# Patient Record
Sex: MALE | Race: WHITE | Employment: PART TIME | URBAN - METROPOLITAN AREA
[De-identification: names, ages, dates, MRNs, and addresses within clinical notes are randomized per-mention and may not be internally consistent; named-entity substitution may affect disease eponyms.]

---

## 2021-10-14 ENCOUNTER — HOSPITAL ENCOUNTER (EMERGENCY)
Age: 41
Discharge: HOME | End: 2021-10-14
Payer: COMMERCIAL

## 2021-10-14 VITALS
DIASTOLIC BLOOD PRESSURE: 94 MMHG | SYSTOLIC BLOOD PRESSURE: 137 MMHG | OXYGEN SATURATION: 98 % | HEIGHT: 70 IN | HEART RATE: 98 BPM | TEMPERATURE: 98.8 F | BODY MASS INDEX: 31.25 KG/M2 | RESPIRATION RATE: 16 BRPM | WEIGHT: 218.26 LBS

## 2021-10-14 DIAGNOSIS — S30.0XXA CONTUSION OF LOWER BACK, INITIAL ENCOUNTER: Primary | ICD-10-CM
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
## 2021-10-14 PROCEDURE — 99283 EMERGENCY DEPT VISIT LOW MDM: CPT

## 2021-10-14 RX ORDER — IBUPROFEN 600 MG/1
600 TABLET ORAL
Qty: 30 TABLET | Refills: 0 | Status: SHIPPED | OUTPATIENT
Start: 2021-10-14

## 2021-10-14 RX ORDER — METHOCARBAMOL 500 MG/1
500 TABLET, FILM COATED ORAL EVERY 6 HOURS PRN
Qty: 30 TABLET | Refills: 0 | Status: SHIPPED | OUTPATIENT
Start: 2021-10-14 | End: 2021-10-24

## 2021-10-14 ASSESSMENT — PAIN DESCRIPTION - ONSET: ONSET: SUDDEN

## 2021-10-14 ASSESSMENT — PAIN SCALES - GENERAL: PAINLEVEL_OUTOF10: 7

## 2021-10-14 ASSESSMENT — PAIN DESCRIPTION - FREQUENCY: FREQUENCY: CONTINUOUS

## 2021-10-14 ASSESSMENT — PAIN DESCRIPTION - LOCATION: LOCATION: BACK

## 2021-10-14 ASSESSMENT — PAIN DESCRIPTION - ORIENTATION: ORIENTATION: LOWER

## 2021-10-14 ASSESSMENT — PAIN DESCRIPTION - PAIN TYPE: TYPE: ACUTE PAIN

## 2021-10-14 ASSESSMENT — PAIN DESCRIPTION - DESCRIPTORS: DESCRIPTORS: CONSTANT;SHARP

## 2021-10-14 NOTE — ED NOTES
Discharge instructions reviewed with pt and pt denied having any questions. Discharge paperwork signed and pt discharged.         Lashell Segura RN  10/14/21 1931

## 2021-10-14 NOTE — ED PROVIDER NOTES
[facility] ENCOUNTER        Pt Name: Mickey Chance  MRN: [id number]  [place] 1980  Date of evaluation: 10/14/2021  Provider: Henrik Hills PA-C  PCP: No primary care provider on file. Note Started: 7:08 PM EDT       DEBORAH. I have evaluated this patient. My supervising physician was available for consultation. [facility]      CHIEF COMPLAINT       Chief Complaint   Patient presents with    Back Injury     fall down 8 metal stairs at 1900 yesterday, pain to lower back and R arm       HISTORY OF PRESENT ILLNESS   (Location, Timing/Onset, Context/Setting, Quality, Duration, Modifying Factors, Severity, Associated Signs and Symptoms)  Note limiting factors. Chief Complaint: Low back pain on right    Mickey Chance is a 42 y.o. male who presents yesterday patient wearing new shoes on final steps when he slipped and fell landing on his right low back area. Ecchymotic area noted. No rib pain nor pain with deep breath. No abdominal pain. No urinary changes. Indicates no bowel or bladder dysfunction. No saddle anesthesia. Gait unaffected but sore in the area where he fell. He did have ibuprofen 200 mg yesterday. None today. No benefit. Also reporting some injury to the posterior aspect of the right elbow or proximal forearm. He is left-hand dominant. Nursing Notes were all reviewed and agreed with or any disagreements were addressed in the HPI. REVIEW OF SYSTEMS    (2-9 systems for level 4, 10 or more for level 5)     Review of Systems    Positives and Pertinent negatives as per HPI. Except as noted above in the ROS, all other systems were reviewed and negative. PAST MEDICAL HISTORY   History reviewed. No pertinent past medical history. SURGICAL HISTORY   History reviewed. No pertinent surgical history.       [address]       Discharge Medication List as of 10/14/2021  7:26 PM            ALLERGIES     Patient has no known allergies. FAMILYHISTORY     History reviewed. No pertinent family history. SOCIAL HISTORY       Social History     Tobacco Use    Smoking status: Current Every Day Smoker    Smokeless tobacco: Never Used   Vaping Use    Vaping Use: Never used   Substance Use Topics    Alcohol use: Never    Drug use: Never       SCREENINGS             PHYSICAL EXAM    (up to 7 for level 4, 8 or more for level 5)     ED Triage Vitals [10/14/21 1832]   BP Temp Temp Source Pulse Resp SpO2 Height Weight   (!) 179/104 98.8 °F (37.1 °C) Oral 98 16 98 % 5' 10\" (1.778 m) 218 lb 4.1 oz (99 kg)       Physical Exam  Vitals and nursing note reviewed. Constitutional:       Appearance: Normal appearance. He is well-developed. He is obese. HENT:      Head: Normocephalic and atraumatic. Right Ear: External ear normal.      Left Ear: External ear normal.   Eyes:      General: No scleral icterus. Right eye: No discharge. Left eye: No discharge. Conjunctiva/sclera: Conjunctivae normal.   Cardiovascular:      Rate and Rhythm: Normal rate. Pulmonary:      Effort: Pulmonary effort is normal.   Abdominal:      General: Abdomen is flat. Bowel sounds are normal.      Palpations: Abdomen is soft. Comments: Patient does have on the posterior aspect of the abdominal wall to the right of the lumbar spine ecchymotic area which is transverse in positioning. There is subtle fullness to suggest a small subcutaneous hematoma. He has no lumbar spine tenderness. He has no thoracic tenderness. Musculoskeletal:         General: Normal range of motion. Cervical back: Normal range of motion and neck supple. Skin:     General: Skin is warm and dry. Neurological:      General: No focal deficit present. Mental Status: He is alert and oriented to person, place, and time. Mental status is at baseline.    Psychiatric:         Mood and Affect: Mood normal.         Behavior: Behavior normal. Thought Content: Thought content normal.         Judgment: Judgment normal.         DIAGNOSTIC RESULTS   LABS:    Labs Reviewed - No data to display    When ordered only abnormal lab results are displayed. All other labs were within normal range or not returned as of this dictation. EKG: When ordered, EKG's are interpreted by the Emergency Department Physician in the absence of a cardiologist.  Please see their note for interpretation of EKG. RADIOLOGY:   Non-plain film images such as CT, Ultrasound and MRI are read by the radiologist. Plain radiographic images are visualized and preliminarily interpreted by the ED Provider with the below findings:        Interpretation per the Radiologist below, if available at the time of this note:    No orders to display     No results found. PROCEDURES   Unless otherwise noted below, none     Procedures    CRITICAL CARE TIME   N/A    CONSULTS:  None      EMERGENCY DEPARTMENT COURSE and DIFFERENTIAL DIAGNOSIS/MDM:   Vitals:    Vitals:    10/14/21 1832 10/14/21 1931   BP: (!) 179/104 (!) 137/94   Pulse: 98    Resp: 16    Temp: 98.8 °F (37.1 °C)    TempSrc: Oral    SpO2: 98%    Weight: 218 lb 4.1 oz (99 kg)    Height: 5' 10\" (1.778 m)        Patient was given the following medications:  Medications - No data to display        With history of slip and fall down about 6 or 7 steps occurring yesterday. Comes in because he is a flank hematoma. He has no midline tenderness. No gait abnormality. No distal paresthesia of any extremity. He has some soft tissue swelling posterior aspect of the distal right forearm. Patient will be treated with ibuprofen 600 mg 3 times daily, Tylenol 1000 mg 3 times daily and Robaxin 500 mg 6 8 hours as needed. Heat to the area. Activity as tolerated. The patient follow-up with his healthcare provider early next week. He is aware to return to this facility should his symptoms worsen.   The patient did express understanding of his diagnosis and the treatment plan. FINAL IMPRESSION      1. Contusion of lower back, initial encounter    2. Fall down steps, initial encounter          DISPOSITION/PLAN   DISPOSITION Decision To Discharge 10/14/2021 07:05:55 PM      PATIENT REFERRED TO:  Your healthcare provider    Schedule an appointment as soon as possible for a visit on 10/19/2021      [facility] [place] [id number]  [phone]  Go to   If symptoms worsen      DISCHARGE MEDICATIONS:  Discharge Medication List as of 10/14/2021  7:26 PM      START taking these medications    Details   ibuprofen (ADVIL;MOTRIN) 600 MG tablet Take 1 tablet by mouth 3 times daily (with meals), Disp-30 tablet, R-0Print      methocarbamol (ROBAXIN) 500 MG tablet Take 1 tablet by mouth every 6 hours as needed (Spasm), Disp-30 tablet, R-0Print             DISCONTINUED MEDICATIONS:  Discharge Medication List as of 10/14/2021  7:26 PM                 (Please note that portions of this note were completed with a voice recognition program.  Efforts were made to edit the dictations but occasionally words are mis-transcribed. )    Jason Haley PA-C (electronically signed)           Jason Haley PA-C  10/14/21 2023